# Patient Record
Sex: FEMALE | Race: WHITE | NOT HISPANIC OR LATINO | URBAN - METROPOLITAN AREA
[De-identification: names, ages, dates, MRNs, and addresses within clinical notes are randomized per-mention and may not be internally consistent; named-entity substitution may affect disease eponyms.]

---

## 2020-05-01 ENCOUNTER — EMERGENCY (EMERGENCY)
Facility: HOSPITAL | Age: 55
LOS: 1 days | Discharge: HOME | End: 2020-05-01
Attending: EMERGENCY MEDICINE | Admitting: EMERGENCY MEDICINE
Payer: MEDICAID

## 2020-05-01 VITALS
SYSTOLIC BLOOD PRESSURE: 132 MMHG | OXYGEN SATURATION: 98 % | TEMPERATURE: 98 F | DIASTOLIC BLOOD PRESSURE: 84 MMHG | HEART RATE: 75 BPM | RESPIRATION RATE: 16 BRPM

## 2020-05-01 VITALS
HEIGHT: 64 IN | HEART RATE: 80 BPM | OXYGEN SATURATION: 98 % | RESPIRATION RATE: 16 BRPM | WEIGHT: 128.97 LBS | TEMPERATURE: 98 F | SYSTOLIC BLOOD PRESSURE: 117 MMHG | DIASTOLIC BLOOD PRESSURE: 74 MMHG

## 2020-05-01 PROCEDURE — 99283 EMERGENCY DEPT VISIT LOW MDM: CPT

## 2020-05-01 RX ORDER — OFLOXACIN 0.3 %
1 DROPS OPHTHALMIC (EYE) ONCE
Refills: 0 | Status: COMPLETED | OUTPATIENT
Start: 2020-05-01 | End: 2020-05-01

## 2020-05-01 RX ADMIN — Medication 1 DROP(S): at 17:21

## 2020-05-01 NOTE — ED PROVIDER NOTE - EYES, MLM
pupils equal, round and reactive to light. EOMI. mild left conjunctival injection. no hyphema. +fluorescin dye update to lateral superior aspect of left corneal. negative Sidel's sign. VA 20/20 bilaterally

## 2020-05-01 NOTE — ED PROVIDER NOTE - PATIENT PORTAL LINK FT
You can access the FollowMyHealth Patient Portal offered by [facility] by registering at the following website: [email or web address]. By joining [employer]’s FollowMyHealth portal, you will also be able to view your health information using other applications (apps) compatible with our system.

## 2020-05-01 NOTE — ED PROVIDER NOTE - PROGRESS NOTE DETAILS
Scribe AS for Dr. Cartagena: 54 y/o female with a PMHx of CAD, BPH, HLD, HTN presents to the ED c/o left eye pain x today. Pt states that she poked her eye with a tissue around two hours ago. No other complaints. PE: WD, WN, NAD, PERRL. EOMI, +left eye had a corneal abrasion, medial aspect between 7 and 10 o'clock. Pt reported immediate relief with tetracaine. Reevaluated patient at bedside.  Patient feeling much improved.  Spoke with patient's opthalmologist, Dr. Simeon Hendrix (was calling patient back). would like patient placed on ofloxacin and will follow up patient closely. drops provided in ED to take home.  An opportunity to ask questions was given.  Discussed the importance of prompt, close medical follow-up.  Patient will return with any changes, concerns or persistent / worsening symptoms.  Understanding of all instructions verbalized. Scribe AS for Dr. Cartagena: 56 y/o female presents to the ED c/o left eye pain x today. Pt states that she poked her eye with a tissue around two hours ago. No other complaints. PE: WD, WN, NAD, PERRL. EOMI, +left eye had a corneal abrasion, medial aspect between 7 and 10 o'clock. Pt reported immediate relief with tetracaine. Reevaluated patient at bedside.  Patient feeling much improved.  Spoke with patient's ophthalmologist, Dr. Siemon Hendrix (was calling patient back). would like patient placed on ofloxacin and will follow up patient closely. drops provided in ED to take home.  An opportunity to ask questions was given.  Discussed the importance of prompt, close medical follow-up.  Patient will return with any changes, concerns or persistent / worsening symptoms.  Understanding of all instructions verbalized.

## 2020-05-01 NOTE — ED PROVIDER NOTE - OBJECTIVE STATEMENT
otherwise healthy 55 year old female presents with left eye pain for the past 2 hours. brushed a napkin near her eye and thinks small piece of napkin may have scratched her eye. pain moderate in nature. denies modifying factors. no blurred vision. has not taken anything for pain or symptoms. does not wear contacts   PCP Mary Harris   ophthalmologist Simeon Hendrix

## 2020-05-01 NOTE — ED PROVIDER NOTE - NSFOLLOWUPINSTRUCTIONS_ED_ALL_ED_FT
tylenol or motrin for pain  ocuflox drops to left eye, 1-2 drops to left eye four times a day for 5 days  follow up with your ophthalmologist 1-2 days

## 2020-05-01 NOTE — ED PROVIDER NOTE - CLINICAL SUMMARY MEDICAL DECISION MAKING FREE TEXT BOX
left eye pain after scratched with edge of napkin. exam consistent with corneal abrasion. no blurred vision. no evidence of globe rupture, hyphema, infection. will start on abx drops. declines pain meds. will follow up with opthalmology

## 2020-05-01 NOTE — ED ADULT NURSE NOTE - OBJECTIVE STATEMENT
Pt came in for left eye pain and redness. Pt stated that she accidentally scratched her left eye while studying on her computer

## 2024-12-30 ENCOUNTER — EMERGENCY (EMERGENCY)
Facility: HOSPITAL | Age: 59
LOS: 0 days | Discharge: HOME | End: 2024-12-30
Attending: STUDENT IN AN ORGANIZED HEALTH CARE EDUCATION/TRAINING PROGRAM
Payer: COMMERCIAL

## 2024-12-30 VITALS
SYSTOLIC BLOOD PRESSURE: 101 MMHG | RESPIRATION RATE: 18 BRPM | DIASTOLIC BLOOD PRESSURE: 64 MMHG | OXYGEN SATURATION: 98 % | HEART RATE: 73 BPM | TEMPERATURE: 98 F

## 2024-12-30 VITALS
OXYGEN SATURATION: 98 % | RESPIRATION RATE: 18 BRPM | SYSTOLIC BLOOD PRESSURE: 133 MMHG | DIASTOLIC BLOOD PRESSURE: 79 MMHG | TEMPERATURE: 98 F | HEART RATE: 76 BPM

## 2024-12-30 DIAGNOSIS — S00.83XA CONTUSION OF OTHER PART OF HEAD, INITIAL ENCOUNTER: ICD-10-CM

## 2024-12-30 DIAGNOSIS — Z88.0 ALLERGY STATUS TO PENICILLIN: ICD-10-CM
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
## 2024-12-30 DIAGNOSIS — Z88.5 ALLERGY STATUS TO NARCOTIC AGENT: ICD-10-CM

## 2024-12-30 PROBLEM — Z78.9 OTHER SPECIFIED HEALTH STATUS: Chronic | Status: ACTIVE | Noted: 2020-05-01

## 2024-12-30 PROCEDURE — 76376 3D RENDER W/INTRP POSTPROCES: CPT | Mod: 26

## 2024-12-30 PROCEDURE — 99284 EMERGENCY DEPT VISIT MOD MDM: CPT

## 2024-12-30 PROCEDURE — 99284 EMERGENCY DEPT VISIT MOD MDM: CPT | Mod: 25

## 2024-12-30 PROCEDURE — 70450 CT HEAD/BRAIN W/O DYE: CPT | Mod: MC

## 2024-12-30 PROCEDURE — 70486 CT MAXILLOFACIAL W/O DYE: CPT | Mod: 26,MC

## 2024-12-30 PROCEDURE — 70486 CT MAXILLOFACIAL W/O DYE: CPT | Mod: MC

## 2024-12-30 PROCEDURE — 76376 3D RENDER W/INTRP POSTPROCES: CPT

## 2024-12-30 PROCEDURE — 70450 CT HEAD/BRAIN W/O DYE: CPT | Mod: 26,MC

## 2024-12-30 PROCEDURE — 72125 CT NECK SPINE W/O DYE: CPT | Mod: 26,MC

## 2024-12-30 PROCEDURE — 72125 CT NECK SPINE W/O DYE: CPT | Mod: MC

## 2024-12-30 RX ORDER — ACETAMINOPHEN 500MG 500 MG/1
650 TABLET, COATED ORAL ONCE
Refills: 0 | Status: COMPLETED | OUTPATIENT
Start: 2024-12-30 | End: 2024-12-30

## 2024-12-30 RX ADMIN — ACETAMINOPHEN 500MG 650 MILLIGRAM(S): 500 TABLET, COATED ORAL at 14:49

## 2024-12-30 NOTE — ED PROVIDER NOTE - CLINICAL SUMMARY MEDICAL DECISION MAKING FREE TEXT BOX
Presentation most concerning for facial fracture.  Patient declines Tdap update or pain control at this time.  Plan for CT imaging monitor and reassessment Presentation most concerning for facial fracture.  Patient declines Tdap update or pain control at this time.  Plan for CT imaging monitor and reassessment. CT imaging shows no acute actionable findings.  Abrasion was cleaned and dressed appropriately.  Patient advised to take Tylenol Motrin as needed for pain and apply ice to decrease swelling.  Instructed to follow-up with primary care doctor, given strict return precautions DC home in stable condition

## 2024-12-30 NOTE — ED PROVIDER NOTE - PATIENT PORTAL LINK FT
You can access the FollowMyHealth Patient Portal offered by [facility] by registering at the following website: [email or web address]. By joining [email or web address]’s FollowMyHealth portal, you will also be able to view your health information using other applications (apps) compatible with our system.

## 2024-12-30 NOTE — ED ADULT NURSE NOTE - OBJECTIVE STATEMENT
Pt presents to ED from home w/ son at bedside s/p fall in street this afternoon. Pt is A&OX4. Pt states "I was walking back to my car, in a rush because my dog was in the car," pt lost footing and fell, (+) head strike, pt denies LOC, blood thinners, vision changes, headache, nausea. See allergies, Pt has small abrasion above L eyebrow not actively bleeding, some ecchymosis on bridge of nose, and notable hematoma to upper L cheek. Pt does not appear to be in any distress. Safety and comfort measures in place, no other complaints at this time.

## 2024-12-30 NOTE — ED ADULT NURSE NOTE - CHIEF COMPLAINT QUOTE
Pt presents to the ED c.o fall. Pt reports that she tripped and fell outside, + head strike, pt denies LOC, not on AC.

## 2024-12-30 NOTE — ED PROVIDER NOTE - NSFOLLOWUPINSTRUCTIONS_ED_ALL_ED_FT
take Tylenol and Motrin as needed for pain at home.  Apply ice to decrease swelling.  Follow-up with your primary care doctor within 48 hours. Return to the Emergency Department for worsening or persistent symptoms, and/or ANY NEW OR CONCERNING SYMPTOMS. If you have issues obtaining follow up, please call: [phone] or [phone]  to obtain a doctor or specialist who takes your insurance in your area.    Contusion in Adults    WHAT YOU NEED TO KNOW:    What is a contusion? A contusion is a bruise that appears on your skin after an injury. A bruise happens when small blood vessels tear but skin does not. Blood leaks into nearby tissue, such as soft tissue or muscle.  Contusion    What increases my risk for a contusion?    A disorder that makes you bleed more easily    Kidney or liver disease, or an infection    Medicines such as blood thinners or certain over-the-counter medicines and herbal medicines    Weakened skin and muscles from older age or nutrition problems  What are the signs and symptoms of a contusion?    An area that may be black, blue, red, or darker than the skin around it    Pain that increases when you touch the bruise, walk, or use the area around the bruise    Swelling or a lump at the site of the bruise or near it    Stiffness or problems moving the bruised area of your body  How is a contusion diagnosed? Your healthcare provider may ask about any injuries, infections, or bleeding problems you had in the past. Your provider will check the skin over the injured area. Your provider may touch it to see where it hurts. Your provider may also check for problems you may have when you move your bruised area. You may also need any of the following:    Blood tests may be used to check for blood disorders or to see how long it takes for your blood to clot.    Ultrasound pictures may show how deep the bruise is and if any of your organs, such as your liver, are injured.    MRI pictures may show if a hematoma (pooling of blood) has started to form. You may be given contrast liquid to help the pictures show up better. Tell the healthcare provider if you have ever had an allergic reaction to contrast liquid. Do not enter the MRI room with anything metal. The MRI machine uses a powerful magnet. Metal can cause serious injury from the magnet. Tell the healthcare provider if you have any metal in or on your body.  How is a contusion treated? A contusion may heal without any treatment. The bruise may become lighter or change to green or yellow as it heals. Treatment depends on the part of your body that is injured, and how serious your injury is. You may need any of the following:    Medicine may be needed to treat or prevent pain or swelling.    Aspiration is a procedure to drain pooled blood in your muscle. This prevents increased pressure in the muscle.    Surgery may be done to repair a tear in the muscle or relieve pressure in the muscle caused by swelling.  What can I do to help my contusion heal?    Rest the injured area or use it less than usual. If you bruised your leg or foot, you may need crutches or a cane to help you walk. This will help you keep weight off your injured body part.    Apply ice to decrease swelling and pain. Ice may also help prevent tissue damage. Use an ice pack, or put crushed ice in a plastic bag. Cover it with a towel and place it on your bruise for 15 to 20 minutes every hour or as directed.    Use compression to support the area and decrease swelling. Wrap an elastic bandage around the area over the bruised muscle. Make sure the bandage is not too tight. You should be able to fit 1 finger between the bandage and your skin.    Elevate (raise) your injured body part above the level of your heart to help decrease pain and swelling. Use pillows, blankets, or rolled towels to elevate the area as often as you can.    Do not drink alcohol as directed. Alcohol may slow healing.    Do not stretch injured muscles right after your injury. Ask your healthcare provider when and how you may safely stretch after your injury. Gentle stretches can help increase your flexibility.    Do not massage the area or put heating pads on the bruise right after your injury. Heat and massage may slow healing. Your healthcare provider may tell you to apply heat after several days. At that time, heat will start to help the injury heal.  R.I.C.E.  How can I prevent a contusion?    Stretch and warm up before you play sports or exercise.    Wear protective gear when you play sports. Examples are shin guards and padding.    If you begin a new physical activity, start slowly to give your body a chance to adjust.  When should I seek immediate care?    You have new trouble moving the injured area.    You have tingling or numbness in or near the injured area.    Your hand or foot below the bruise gets cold or turns pale.  When should I call my doctor?    You find a new lump in the injured area.    Your symptoms do not improve with treatment after 4 to 5 days.    You have questions or concerns about your condition or care.  CARE AGREEMENT:    You have the right to help plan your care. Learn about your health condition and how it may be treated. Discuss treatment options with your healthcare providers to decide what care you want to receive. You always have the right to refuse treatment.

## 2024-12-30 NOTE — ED PROVIDER NOTE - PHYSICAL EXAMINATION
Constitutional: well appearing, NAD AAOx3  Eyes: EOMI, PERRL  Head: Normocephalic +left lateral facial swelling and tenderness with small superficial laceration above left eyebrow  Mouth: no airway obstruction, posterior oropharynx clear without erythema or exudate  Neck: supple  Cardiac: regular rate and rhythm, no MRG  Resp: Lungs CTAB  GI: Abd s/nt/nd  Neuro: CN2-12 intact, strength 5/5x4, sensation grossly intact  Skin: No rashes  MSK: no midline tenderness of ctl spine, no deformity or tenderness of extremities x4 Constitutional: well appearing, NAD AAOx3  Eyes: EOMI, PERRL  Head: Normocephalic +left lateral facial swelling and tenderness with small superficial abrasion above left eyebrow  Mouth: no airway obstruction, posterior oropharynx clear without erythema or exudate  Neck: supple  Cardiac: regular rate and rhythm, no MRG  Resp: Lungs CTAB  GI: Abd s/nt/nd  Neuro: CN2-12 intact, strength 5/5x4, sensation grossly intact  Skin: No rashes  MSK: no midline tenderness of ctl spine, no deformity or tenderness of extremities x4

## 2024-12-30 NOTE — ED ADULT NURSE NOTE - NSFALLRISKINTERV_ED_ALL_ED
Communicate fall risk and risk factors to all staff, patient, and family/Provide visual cue: yellow wristband, yellow gown, etc/Reinforce activity limits and safety measures with patient and family/Call bell, personal items and telephone in reach/Instruct patient to call for assistance before getting out of bed/chair/stretcher/Non-slip footwear applied when patient is off stretcher/[place] to call system/Physically safe environment - no spills, clutter or unnecessary equipment/Purposeful Proactive Rounding/Room/bathroom lighting operational, light cord in reach

## 2024-12-30 NOTE — ED PROVIDER NOTE - OBJECTIVE STATEMENT
59-year-old otherwise healthy female who presents today with facial pain and swelling status post mechanical fall.  Patient states that she was running out of a store when she tripped and fell hitting her left face, no LOC, no anticoagulation.  Denies any vision changes, numbness tingling weakness, neck pain.  No other complaints including chest pain, shortness breath, cough, nausea vomiting abdominal pain, urinary symptoms, diarrhea, black and bloody stools, back pain.